# Patient Record
Sex: MALE | Race: WHITE | NOT HISPANIC OR LATINO | URBAN - METROPOLITAN AREA
[De-identification: names, ages, dates, MRNs, and addresses within clinical notes are randomized per-mention and may not be internally consistent; named-entity substitution may affect disease eponyms.]

---

## 2022-04-30 ENCOUNTER — TELEPHONE ENCOUNTER (OUTPATIENT)
Dept: URBAN - METROPOLITAN AREA CLINIC 121 | Facility: CLINIC | Age: 62
End: 2022-04-30

## 2022-04-30 RX ORDER — TEA TREE OIL 100 %
SPRAY, NON-AEROSOL (ML) TOPICAL
OUTPATIENT
Start: 2012-08-09

## 2022-04-30 RX ORDER — PANTOPRAZOLE SODIUM 20 MG
TABLET, DELAYED RELEASE (ENTERIC COATED) ORAL
OUTPATIENT
Start: 2012-08-09

## 2022-04-30 RX ORDER — TIMOLOL MALEATE 5 MG/ML
SOLUTION OPHTHALMIC
OUTPATIENT
Start: 2012-08-09

## 2022-04-30 RX ORDER — BIMATOPROST 0.1 MG/ML
SOLUTION/ DROPS OPHTHALMIC
OUTPATIENT
Start: 2012-08-09

## 2022-05-01 ENCOUNTER — TELEPHONE ENCOUNTER (OUTPATIENT)
Dept: URBAN - METROPOLITAN AREA CLINIC 121 | Facility: CLINIC | Age: 62
End: 2022-05-01

## 2022-05-01 RX ORDER — PANTOPRAZOLE SODIUM 20 MG
QD TABLET, DELAYED RELEASE (ENTERIC COATED) ORAL
Status: ACTIVE | COMMUNITY
Start: 2012-12-27

## 2022-05-01 RX ORDER — ASPIRIN 81 MG
QD TABLET, DELAYED RELEASE (ENTERIC COATED) ORAL
Status: ACTIVE | COMMUNITY
Start: 2012-12-27

## 2022-05-01 RX ORDER — LISINOPRIL 10 MG/1
QD TABLET ORAL
Status: ACTIVE | COMMUNITY
Start: 2012-12-27

## 2022-05-01 RX ORDER — TEA TREE OIL 100 %
PRN SPRAY, NON-AEROSOL (ML) TOPICAL
Status: ACTIVE | COMMUNITY
Start: 2012-12-27

## 2022-05-01 RX ORDER — TIMOLOL MALEATE 5 MG/ML
QD OU SOLUTION OPHTHALMIC
Status: ACTIVE | COMMUNITY
Start: 2012-12-27

## 2022-05-01 RX ORDER — BIMATOPROST 0.1 MG/ML
QD OU SOLUTION/ DROPS OPHTHALMIC
Status: ACTIVE | COMMUNITY
Start: 2012-12-27

## 2022-08-02 ENCOUNTER — DASHBOARD ENCOUNTERS (OUTPATIENT)
Age: 62
End: 2022-08-02

## 2022-08-02 ENCOUNTER — WEB ENCOUNTER (OUTPATIENT)
Dept: URBAN - METROPOLITAN AREA CLINIC 27 | Facility: CLINIC | Age: 62
End: 2022-08-02

## 2022-08-02 ENCOUNTER — OFFICE VISIT (OUTPATIENT)
Dept: URBAN - METROPOLITAN AREA CLINIC 27 | Facility: CLINIC | Age: 62
End: 2022-08-02
Payer: COMMERCIAL

## 2022-08-02 VITALS
RESPIRATION RATE: 17 BRPM | SYSTOLIC BLOOD PRESSURE: 127 MMHG | WEIGHT: 207 LBS | BODY MASS INDEX: 28.04 KG/M2 | TEMPERATURE: 96.9 F | HEIGHT: 72 IN | HEART RATE: 63 BPM | DIASTOLIC BLOOD PRESSURE: 91 MMHG

## 2022-08-02 DIAGNOSIS — I10 PRIMARY HYPERTENSION: ICD-10-CM

## 2022-08-02 DIAGNOSIS — Z12.11 ENCOUNTER FOR SCREENING FOR MALIGNANT NEOPLASM OF COLON: ICD-10-CM

## 2022-08-02 PROBLEM — 23986001: Status: ACTIVE | Noted: 2022-08-02

## 2022-08-02 PROBLEM — 55822004: Status: ACTIVE | Noted: 2022-08-02

## 2022-08-02 PROBLEM — 59621000: Status: ACTIVE | Noted: 2022-08-02

## 2022-08-02 PROCEDURE — 99242 OFF/OP CONSLTJ NEW/EST SF 20: CPT | Performed by: INTERNAL MEDICINE

## 2022-08-02 PROCEDURE — 99204 OFFICE O/P NEW MOD 45 MIN: CPT | Performed by: INTERNAL MEDICINE

## 2022-08-02 RX ORDER — BIMATOPROST 0.1 MG/ML
QD OU SOLUTION/ DROPS OPHTHALMIC
Status: DISCONTINUED | COMMUNITY
Start: 2012-12-27

## 2022-08-02 RX ORDER — TIMOLOL MALEATE 5 MG/ML
QD OU SOLUTION OPHTHALMIC
Status: DISCONTINUED | COMMUNITY
Start: 2012-12-27

## 2022-08-02 RX ORDER — BRIMONIDINE TARTRATE 2 MG/ML
1 DROP INTO AFFECTED EYE SOLUTION/ DROPS OPHTHALMIC
Status: ACTIVE | COMMUNITY

## 2022-08-02 RX ORDER — LATANOPROST 50 UG/ML
1 DROP INTO AFFECTED EYE IN THE EVENING SOLUTION/ DROPS OPHTHALMIC ONCE A DAY
Status: ACTIVE | COMMUNITY

## 2022-08-02 RX ORDER — LISINOPRIL 10 MG/1
QD TABLET ORAL
Status: ACTIVE | COMMUNITY
Start: 2012-12-27

## 2022-08-02 RX ORDER — TEA TREE OIL 100 %
PRN SPRAY, NON-AEROSOL (ML) TOPICAL
Status: ACTIVE | COMMUNITY
Start: 2012-12-27

## 2022-08-02 RX ORDER — NETARSUDIL 0.2 MG/ML
1 DROP INTO AFFECTED EYE IN THE EVENING SOLUTION/ DROPS OPHTHALMIC; TOPICAL ONCE A DAY
Status: ACTIVE | COMMUNITY

## 2022-08-02 RX ORDER — PANTOPRAZOLE SODIUM 20 MG
QD TABLET, DELAYED RELEASE (ENTERIC COATED) ORAL
Status: DISCONTINUED | COMMUNITY
Start: 2012-12-27

## 2022-08-02 RX ORDER — ATORVASTATIN CALCIUM 10 MG/1
1 TABLET TABLET, FILM COATED ORAL ONCE A DAY
Status: ACTIVE | COMMUNITY

## 2022-08-02 RX ORDER — FLUOXETINE 20 MG/1
1 CAPSULE CAPSULE ORAL ONCE A DAY
Status: ACTIVE | COMMUNITY

## 2022-08-02 RX ORDER — ACETAZOLAMIDE 250 MG/1
1 TABLET AS NEEDED TABLET ORAL ONCE A DAY
Status: ACTIVE | COMMUNITY

## 2022-08-02 RX ORDER — DORZOLAMIDE HYDROCHLORIDE AND TIMOLOL MALEATE 20; 5 MG/ML; MG/ML
1 DROP INTO AFFECTED EYE SOLUTION/ DROPS OPHTHALMIC TWICE A DAY
Status: ACTIVE | COMMUNITY

## 2022-08-02 RX ORDER — ASPIRIN 81 MG
QD TABLET, DELAYED RELEASE (ENTERIC COATED) ORAL
Status: DISCONTINUED | COMMUNITY
Start: 2012-12-27

## 2022-08-02 NOTE — HPI-TODAY'S VISIT:
Mr. Tom is a 62-year-old male seen at the request of Dr. Jose Angela for CRC screening. A copy of this document is being forwarded to his referring provider. His last colonoscopy 10 years ago was normal aside for a hyperplastic polyp. He presents today without complaints. He denies nausea, vomiting, abd pain, diarrhea, constipation, melena, hematochezia or unexplained weight loss. He does not have a family history of colon polyps nor colon cancer. His medical history is significant for hypertension, hyperlipidemia, and glaucoma.

## 2022-10-13 ENCOUNTER — TELEPHONE ENCOUNTER (OUTPATIENT)
Dept: URBAN - METROPOLITAN AREA CLINIC 27 | Facility: CLINIC | Age: 62
End: 2022-10-13

## 2022-10-14 ENCOUNTER — OFFICE VISIT (OUTPATIENT)
Dept: URBAN - METROPOLITAN AREA SURGERY CENTER 7 | Facility: SURGERY CENTER | Age: 62
End: 2022-10-14

## 2023-08-01 ENCOUNTER — TELEPHONE ENCOUNTER (OUTPATIENT)
Dept: URBAN - METROPOLITAN AREA CLINIC 27 | Facility: CLINIC | Age: 63
End: 2023-08-01

## 2024-11-01 ENCOUNTER — OFFICE VISIT (OUTPATIENT)
Dept: URBAN - METROPOLITAN AREA SURGERY CENTER 7 | Facility: SURGERY CENTER | Age: 64
End: 2024-11-01